# Patient Record
Sex: FEMALE | Race: WHITE
[De-identification: names, ages, dates, MRNs, and addresses within clinical notes are randomized per-mention and may not be internally consistent; named-entity substitution may affect disease eponyms.]

---

## 2018-06-30 ENCOUNTER — HOSPITAL ENCOUNTER (EMERGENCY)
Dept: HOSPITAL 26 - MED | Age: 38
Discharge: HOME | End: 2018-06-30
Payer: COMMERCIAL

## 2018-06-30 VITALS — WEIGHT: 132.5 LBS | HEIGHT: 65 IN | BODY MASS INDEX: 22.08 KG/M2

## 2018-06-30 VITALS — DIASTOLIC BLOOD PRESSURE: 54 MMHG | SYSTOLIC BLOOD PRESSURE: 98 MMHG

## 2018-06-30 VITALS — SYSTOLIC BLOOD PRESSURE: 124 MMHG | DIASTOLIC BLOOD PRESSURE: 89 MMHG

## 2018-06-30 DIAGNOSIS — G43.909: Primary | ICD-10-CM

## 2018-06-30 DIAGNOSIS — R11.2: ICD-10-CM

## 2018-06-30 DIAGNOSIS — H53.149: ICD-10-CM

## 2018-06-30 DIAGNOSIS — Z88.6: ICD-10-CM

## 2018-06-30 PROCEDURE — 96361 HYDRATE IV INFUSION ADD-ON: CPT

## 2018-06-30 PROCEDURE — 96374 THER/PROPH/DIAG INJ IV PUSH: CPT

## 2018-06-30 PROCEDURE — 96375 TX/PRO/DX INJ NEW DRUG ADDON: CPT

## 2018-06-30 PROCEDURE — 99284 EMERGENCY DEPT VISIT MOD MDM: CPT

## 2018-06-30 NOTE — NUR
HA X 1HR 5/10, N/V X 6 HRS.



PT ADMITS N/V DENIES DIARRHEA; SKIN IS INTACT, PINK/WARM/DRY; AAOX4, PERRL, 
WITH EVEN AND STEADY GAIT; LUNGS CLEAR BL, BREATHING UNLABORED; HR EVEN AND 
REGULAR, BL PERIPHERAL PULSES PRESENT; BS ACTIVE X4, NO TENDERNESS TO 
PALPATION, NO HEPATOSPLENOMEGALLY PALPATED, RESONANT TO PERCUSSION; PT DENIES 
ANY FEVER, CP, SOB, OR COUGH AT THIS TIME; PT STATES 5/10 MIGRAINE HEADACHE 
PAIN AT THIS TIME; VSS; PATIENT POSITIONED FOR COMFORT; HOB ELEVATED; BEDRAILS 
UP X2; BED DOWN.

## 2018-06-30 NOTE — NUR
Patient discharged with v/s stable. Written and verbal after care instructions 
given and explained. 

Patient alert, oriented and verbalized understanding of instructions. 
Ambulatory with steady gait. All questions addressed prior to discharge. ID 
band removed. Patient advised to follow up with PMD. Rx of REGLAN AND IBUPROFEN 
given. Patient educated on indication of medication including possible reaction 
and side effects. Opportunity to ask questions provided and answered.

## 2020-12-22 ENCOUNTER — HOSPITAL ENCOUNTER (EMERGENCY)
Dept: HOSPITAL 26 - MED | Age: 40
Discharge: HOME | End: 2020-12-22
Payer: COMMERCIAL

## 2020-12-22 VITALS — SYSTOLIC BLOOD PRESSURE: 110 MMHG | DIASTOLIC BLOOD PRESSURE: 86 MMHG

## 2020-12-22 VITALS — HEIGHT: 65 IN | BODY MASS INDEX: 24.66 KG/M2 | WEIGHT: 148 LBS

## 2020-12-22 DIAGNOSIS — M54.5: Primary | ICD-10-CM

## 2020-12-22 NOTE — NUR
C/O LOWER BACK PAIN S/P MECHANISM OF INJURY X TODAY. DENIES DYSURIA. GOT 
TORADOL  30 MG IM AT 2.10 PM AT [facility].

 PMH: DENIES

## 2020-12-22 NOTE — NUR
Patient discharged with v/s stable. Written and verbal after care instructions 
given and explained. 

Patient alert, oriented and verbalized understanding of instructions. 
Ambulatory with steady gait. All questions addressed prior to discharge. ID 
band removed. Patient advised to follow up with PMD. Rx of MOBIC given. Patient 
educated on indication of medication including possible reaction and side 
effects. Opportunity to ask questions provided and answered.